# Patient Record
Sex: FEMALE | NOT HISPANIC OR LATINO | Employment: UNEMPLOYED | URBAN - METROPOLITAN AREA
[De-identification: names, ages, dates, MRNs, and addresses within clinical notes are randomized per-mention and may not be internally consistent; named-entity substitution may affect disease eponyms.]

---

## 2017-02-08 ENCOUNTER — OFFICE VISIT - HEALTHEAST (OUTPATIENT)
Dept: FAMILY MEDICINE | Facility: CLINIC | Age: 57
End: 2017-02-08

## 2017-02-08 DIAGNOSIS — L03.317 CELLULITIS OF BUTTOCK, RIGHT: ICD-10-CM

## 2017-02-08 DIAGNOSIS — Z23 NEEDS FLU SHOT: ICD-10-CM

## 2017-02-12 ENCOUNTER — COMMUNICATION - HEALTHEAST (OUTPATIENT)
Dept: FAMILY MEDICINE | Facility: CLINIC | Age: 57
End: 2017-02-12

## 2017-11-08 ENCOUNTER — OFFICE VISIT - HEALTHEAST (OUTPATIENT)
Dept: FAMILY MEDICINE | Facility: CLINIC | Age: 57
End: 2017-11-08

## 2017-11-08 DIAGNOSIS — R60.0 LOWER EXTREMITY EDEMA: ICD-10-CM

## 2017-11-08 DIAGNOSIS — Z12.4 CERVICAL CANCER SCREENING: ICD-10-CM

## 2017-11-08 DIAGNOSIS — Z23 NEED FOR INFLUENZA VACCINATION: ICD-10-CM

## 2017-11-08 DIAGNOSIS — Z13.1 SCREENING FOR DIABETES MELLITUS: ICD-10-CM

## 2017-11-08 DIAGNOSIS — Z13.29 SCREENING FOR THYROID DISORDER: ICD-10-CM

## 2017-11-08 DIAGNOSIS — Z13.220 SCREENING FOR CHOLESTEROL LEVEL: ICD-10-CM

## 2017-11-08 DIAGNOSIS — Z12.11 SCREENING FOR COLON CANCER: ICD-10-CM

## 2017-11-08 DIAGNOSIS — Z00.00 ROUTINE GENERAL MEDICAL EXAMINATION AT A HEALTH CARE FACILITY: ICD-10-CM

## 2017-11-08 DIAGNOSIS — D50.9 IRON DEFICIENCY ANEMIA: ICD-10-CM

## 2017-11-08 LAB — HBA1C MFR BLD: 5.6 % (ref 3.5–6)

## 2017-11-08 ASSESSMENT — MIFFLIN-ST. JEOR: SCORE: 1336.11

## 2017-11-09 ENCOUNTER — COMMUNICATION - HEALTHEAST (OUTPATIENT)
Dept: FAMILY MEDICINE | Facility: CLINIC | Age: 57
End: 2017-11-09

## 2017-11-09 ENCOUNTER — AMBULATORY - HEALTHEAST (OUTPATIENT)
Dept: FAMILY MEDICINE | Facility: CLINIC | Age: 57
End: 2017-11-09

## 2017-11-09 DIAGNOSIS — D50.9 IRON DEFICIENCY ANEMIA: ICD-10-CM

## 2017-11-09 RX ORDER — FERROUS SULFATE 325(65) MG
1 TABLET ORAL 2 TIMES DAILY
Qty: 60 TABLET | Refills: 3 | Status: SHIPPED | OUTPATIENT
Start: 2017-11-09 | End: 2024-08-28

## 2017-11-13 LAB
HPV INTERPRETATION - HISTORICAL: NORMAL
HPV INTERPRETER - HISTORICAL: NORMAL

## 2017-11-15 LAB
BKR LAB AP ABNORMAL BLEEDING: YES
BKR LAB AP BIRTH CONTROL/HORMONES: NORMAL
BKR LAB AP CERVICAL APPEARANCE: NORMAL
BKR LAB AP GYN ADEQUACY: NORMAL
BKR LAB AP GYN INTERPRETATION: NORMAL
BKR LAB AP HPV REFLEX: NORMAL
BKR LAB AP LMP: NORMAL
BKR LAB AP PATIENT STATUS: NORMAL
BKR LAB AP PREVIOUS ABNORMAL: NORMAL
BKR LAB AP PREVIOUS NORMAL: NORMAL
HIGH RISK?: YES
PATH REPORT.COMMENTS IMP SPEC: NORMAL
RESULT FLAG (HE HISTORICAL CONVERSION): NORMAL

## 2017-12-14 ENCOUNTER — COMMUNICATION - HEALTHEAST (OUTPATIENT)
Dept: FAMILY MEDICINE | Facility: CLINIC | Age: 57
End: 2017-12-14

## 2018-06-06 ENCOUNTER — COMMUNICATION - HEALTHEAST (OUTPATIENT)
Dept: FAMILY MEDICINE | Facility: CLINIC | Age: 58
End: 2018-06-06

## 2018-06-14 ENCOUNTER — COMMUNICATION - HEALTHEAST (OUTPATIENT)
Dept: FAMILY MEDICINE | Facility: CLINIC | Age: 58
End: 2018-06-14

## 2021-05-26 ENCOUNTER — RECORDS - HEALTHEAST (OUTPATIENT)
Dept: ADMINISTRATIVE | Facility: CLINIC | Age: 61
End: 2021-05-26

## 2021-05-30 VITALS — WEIGHT: 147.12 LBS | BODY MASS INDEX: 20.52 KG/M2

## 2021-05-31 VITALS — BODY MASS INDEX: 20.61 KG/M2 | HEIGHT: 71 IN | WEIGHT: 147.25 LBS

## 2021-06-08 NOTE — PROGRESS NOTES
ASSESSMENT & PLAN:  1. Cellulitis of buttock, right  - cephalexin (KEFLEX) 500 MG capsule; Take 1 capsule (500 mg total) by mouth 3 (three) times a day for 14 days.  Dispense: 42 capsule; Refill: 0  - Culture, Wound, result is pending, adjust treatment based on culture and sensitivity  Symptomatic care discussed with heat, topical wound care, oral antibiotic as prescribed, follow up if not improving.  2. Needs flu shot  - Influenza, Seasonal Quad, Preservative Free 36+ Months    There are no Patient Instructions on file for this visit.    Orders Placed This Encounter   Procedures     Culture, Wound     LOCATION: right buttock     Influenza, Seasonal Quad, Preservative Free 36+ Months     There are no discontinued medications.    No Follow-up on file.    CHIEF COMPLAINT:  Chief Complaint   Patient presents with     Other     Abscess- on buttock- right side- not drying up. May need antibiotic      Flu Vaccine     needs flu shot       HISTORY OF PRESENT ILLNESS:  Yamilet is a 56 y.o. female presenting to the clinic today complaining of a right buttock abscess x 3 weeks. The abscess was initially draining and enlarged. It has since decreased in size and stopped draining, but she feels pus is still collecting in the area. There is pain surrounding the area. She applied antibiotic ointment and gauze to the area and took warm baths. She had a similar abscess on the left side in December 2016, which resolved on its own.    Health Maintenance:  She received the influenza vaccine today.    REVIEW OF SYSTEMS:   All other systems are negative.    PFSH:  Tobacco Use:  History   Smoking Status     Never Smoker   Smokeless Tobacco     Never Used       VITALS:  Vitals:    02/08/17 1323   BP: 102/68   Patient Site: Right Arm   Patient Position: Sitting   Cuff Size: Adult Regular   Pulse: 82   SpO2: 99%   Weight: 147 lb 1.9 oz (66.7 kg)     Wt Readings from Last 3 Encounters:   02/08/17 147 lb 1.9 oz (66.7 kg)   10/17/16 144 lb (65.3  kg)   10/14/16 147 lb 11.2 oz (67 kg)     Body mass index is 20.52 kg/(m^2).    PHYSICAL EXAM:  General:  Patient alert, in no acute distress.  Skin:  3x4 cm erythema in lower inner portion of the right gluteus with a 0.5 cm scab in the center. Small pustular lesion a small distance from the first lesion indicative of a new lesion.  Neuro:  CN II-XII intact.  Psychiatric:  Alert & oriented x 3    ADDITIONAL HISTORY SUMMARIZED (2): None.  DECISION TO OBTAIN EXTRA INFORMATION (1): None.   RADIOLOGY TESTS (1): None.  LABS (1): Labs ordered today.  MEDICINE TESTS (1): None.  INDEPENDENT REVIEW (2 each): None.     The visit lasted a total of 9 minutes face to face with the patient. Over 50% of the time was spent counseling and educating the patient about abscess.    ILuna, am scribing for and in the presence of, Dr. Yanes.    I, Dr. Yanes, personally performed the services described in this documentation, as scribed by Luna Magaña in my presence, and it is both accurate and complete.    Dragon dictation was used for this note.  Speech recognition errors are a possibility.    MEDICATIONS:  Current Outpatient Prescriptions   Medication Sig Dispense Refill     cephalexin (KEFLEX) 500 MG capsule Take 1 capsule (500 mg total) by mouth 3 (three) times a day for 14 days. 42 capsule 0     No current facility-administered medications for this visit.        Total data points: 1

## 2021-06-14 NOTE — PROGRESS NOTES
FEMALE PREVENTIVE EXAM    Assessment & Plan   1. Routine general medical examination at a health care facility:  Not fasting, will return for lipid panel.  Pap today, if normal will repeat in five years    2. Cervical cancer screening  - Gynecologic Cytology (PAP Smear)    3. Iron deficiency anemia:  Recheck today and advise on iron supplementation  - HM2(CBC w/o Differential)  - Ferritin    4. Lower extremity edema:  Mild edema on exam, recommended continued use of compression stockings and decrease use of NSAIDs  - Basic Metabolic Panel    5. Screening for cholesterol level  - Lipid Cascade; Future    6. Screening for diabetes mellitus  - Glycosylated Hemoglobin A1c    7. Screening for thyroid disorder  - Thyroid Cascade    8. Need for influenza vaccination  - Influenza, Seasonal Quad, Preservative Free 36+ Months    9. Screening for colon cancer : Order form completed for Cologuard, she will check with insurance and contact if she would like this faxed    Recommend repeat pap smear if normal every five years  Alcohol use, safety and moderation discussed.  Recommended adequate calcium intake/osteoporosis prevention.  Discussed colon cancer screening at age 50, 45 if -American.  Diet discuss, including moderation of portions sizes, avoiding eating out and fast food and increase in fruits and vegetables.  Discussed safe sex practices.  Discussed & recommended seat belt (& motorcycle helmet) use.      Kaela Lechuga CNP    Subjective:   Chief Complaint:  Annual Exam (non-fasting; pap) and Leg Swelling (bilateral leg and foot swelling, started this summer; started exercising more and wears compression stockings, would like to know if there is anything else she should be doing)    HPI:  Yamilet Hedrick is a 57 y.o. female who presents for routine physical exam.  She is a previous patient of Dr. Tidwell in the distant past.  PMH notable for anemia, otherwise negative.     Currently following with  [facility] for abnormal mammogram with calcifications.  Is deciding whether to proceed with biopsy or repeat mammogram in six months.  FMH negative except for first cousin.      Anemia: History of anemia for many years, though to be related to heavy menstrual cycles.  Currently taking ferrous gluconate 27mg daily.  Colonoscopy and endoscopy at age 42 were normal.      LE edema:  Bilateral for over a year.  Improves with use of compression stockings.  Has increased activity level and now training for a 5K.  This has helped significantly, much improved from prior to regular exercise routine.  Denies SOB, chest pain. Of note, has been taking ibuprofen almost daily for menstrual related discomfort and muscle aches.      Periods heavier, becoming more irregular with timing.  No intramenstrual bleeding. Notes cramping has worsened slightly.      Has followed with derm for skin checks.  FMH of non-melanoma skin cancer in father    OB/Gyn History:  Menstrual history: periods becoming more heavy  Date of previous pap:   History of abnormal pap: none  Current Contraceptive method: none    Preventive Health:  Reviewed and recommended screening and treatment recommendations:  Mammography: see HPI  Colonoscopy: at age 42.  Interested in Cologuard  Immunizations: up to date    Health Habits:    Exercise: yes.  Calcium intake/Osteoporosis prevention: yes  Guns: NO  Sun Screen: YES    PMH:   Patient Active Problem List   Diagnosis     Anemia     Vaginal Itching      Delivery - Delivered     Cellulitis Of The Buttocks       No past medical history on file.    Current Medications: Reviewed   No current outpatient prescriptions on file prior to visit.     No current facility-administered medications on file prior to visit.        Allergies:  Reviewed  has No Known Allergies.    Social History:  Social History     Occupational History     Not on file.     Social History Main Topics     Smoking status: Never Smoker      "Smokeless tobacco: Never Used     Alcohol use Not on file     Drug use: Not on file     Sexual activity: Not on file       Family History:   No family history on file.      Review of Systems:  Complete head to toe review of systems is otherwise negative except as above.    Objective:    /62 (Patient Site: Left Arm, Patient Position: Sitting, Cuff Size: Adult Regular)  Ht 5' 10.5\" (1.791 m)  Wt 147 lb 4 oz (66.8 kg)  LMP 10/30/2017 (Exact Date)  Breastfeeding? No  BMI 20.83 kg/m2    GENERAL: Alert, well-appearing female   PSYCH: Pleasant mood, affect appropriate.    SKIN: Darker moles with irregular border on left anterior shoulder and right lateral foot.    HEAD: Normocephalic, atraumatic  EYES: Conjunctiva pink, sclera white, no exudates. MABLE.  EOMs intact. Corneal light reflex bilaterally, red reflex present. Undilated fundoscopic exam normal  EARS: TMs pearly grey, no bulging, redness, retraction.   MOUTH: Pharynx moist, pink without exudate. No tonsillar enlargement  NECK: No lymphadenopathy. Thyroid borders smooth without enlargement, nodules.   CV: Regular rate and rhythm without murmurs, rubs or gallops.  RESP: Lung sounds clear, symmetric excursion.   ABDOMEN: BS+. Abdomen soft, nontender to palpation without guarding. No organomegaly, masses or hernias  BREASTS: Breasts symmetric, no dimpling, masses or skin discolorations seen. Areolas and nipples symmetric without discharge. On palpation, breast tissue supple and nontender. No masses or nodules. Axillary and epitrochlear lymph nodes nonpalpable.    FEMALE: External genitalia without lesions. Vaginal walls and cervix without lesions or masses. No abnormal discharge. Pap smear obtained. On bimanual palpation, uterus mobile, normal shape and contour. No adnexal masses or tenderness.   MSK: Spine and extremities in alignment.   NEURO: Alert, oriented.   PV :  No edema, tenderness or varicosities.         "

## 2021-07-03 NOTE — ADDENDUM NOTE
Addendum Note by Georgette Yanes MD at 2/9/2017 11:58 AM     Author: Georgette Yanes MD Service: -- Author Type: Physician    Filed: 2/9/2017 11:58 AM Encounter Date: 2/8/2017 Status: Signed    : Georgette Yanes MD (Physician)    Addended by: GEORGETTE YANES on: 2/9/2017 11:58 AM        Modules accepted: Carmen

## 2024-01-17 ENCOUNTER — IMMUNIZATION (OUTPATIENT)
Dept: FAMILY MEDICINE | Facility: CLINIC | Age: 64
End: 2024-01-17
Payer: COMMERCIAL

## 2024-01-17 DIAGNOSIS — Z23 ENCOUNTER FOR IMMUNIZATION: Primary | ICD-10-CM

## 2024-01-17 PROCEDURE — 90471 IMMUNIZATION ADMIN: CPT

## 2024-01-17 PROCEDURE — 90686 IIV4 VACC NO PRSV 0.5 ML IM: CPT

## 2024-01-17 PROCEDURE — 91320 SARSCV2 VAC 30MCG TRS-SUC IM: CPT

## 2024-01-17 PROCEDURE — 99207 PR NO CHARGE NURSE ONLY: CPT

## 2024-01-17 PROCEDURE — 90480 ADMN SARSCOV2 VAC 1/ONLY CMP: CPT

## 2024-01-17 NOTE — PROGRESS NOTES
.immPrior to immunization administration, verified patients identity using patient s name and date of birth. Please see Immunization Activity for additional information.     Screening Questionnaire for Adult Immunization    Are you sick today?   No   Do you have allergies to medications, food, a vaccine component or latex?   No   Have you ever had a serious reaction after receiving a vaccination?   No   Do you have a long-term health problem with heart, lung, kidney, or metabolic disease (e.g., diabetes), asthma, a blood disorder, no spleen, complement component deficiency, a cochlear implant, or a spinal fluid leak?  Are you on long-term aspirin therapy?   No   Do you have cancer, leukemia, HIV/AIDS, or any other immune system problem?   No   Do you have a parent, brother, or sister with an immune system problem?   No   In the past 3 months, have you taken medications that affect  your immune system, such as prednisone, other steroids, or anticancer drugs; drugs for the treatment of rheumatoid arthritis, Crohn s disease, or psoriasis; or have you had radiation treatments?   No   Have you had a seizure, or a brain or other nervous system problem?   No   During the past year, have you received a transfusion of blood or blood    products, or been given immune (gamma) globulin or antiviral drug?   No   For women: Are you pregnant or is there a chance you could become       pregnant during the next month?   No   Have you received any vaccinations in the past 4 weeks?   No     Immunization questionnaire answers were all negative.    I have reviewed the following standing orders:   This patient is due and qualifies for the Covid-19 vaccine.     Click here for COVID-19 Standing Order    I have reviewed the vaccines inclusion and exclusion criteria; No concerns regarding eligibility.     This patient is due and qualifies for the Influenza vaccine.    Click here for Influenza Vaccine Standing Order    I have reviewed the  vaccines inclusion and exclusion criteria; No concerns regarding eligibility.     Patient instructed to remain in clinic for 15 minutes afterwards, and to report any adverse reactions.     Screening performed by Reynaldo Hoskins MA on 1/17/2024 at 1:39 PM.

## 2024-03-02 ENCOUNTER — HEALTH MAINTENANCE LETTER (OUTPATIENT)
Age: 64
End: 2024-03-02

## 2024-08-28 ENCOUNTER — OFFICE VISIT (OUTPATIENT)
Dept: INTERNAL MEDICINE | Facility: CLINIC | Age: 64
End: 2024-08-28
Payer: COMMERCIAL

## 2024-08-28 VITALS
SYSTOLIC BLOOD PRESSURE: 102 MMHG | RESPIRATION RATE: 15 BRPM | BODY MASS INDEX: 20.3 KG/M2 | HEART RATE: 75 BPM | OXYGEN SATURATION: 99 % | HEIGHT: 71 IN | DIASTOLIC BLOOD PRESSURE: 63 MMHG | TEMPERATURE: 97.5 F | WEIGHT: 145 LBS

## 2024-08-28 DIAGNOSIS — N95.1 MENOPAUSAL SYNDROME (HOT FLASHES): ICD-10-CM

## 2024-08-28 DIAGNOSIS — Z12.31 VISIT FOR SCREENING MAMMOGRAM: ICD-10-CM

## 2024-08-28 DIAGNOSIS — Z12.4 CERVICAL CANCER SCREENING: ICD-10-CM

## 2024-08-28 DIAGNOSIS — Z11.4 SCREENING FOR HIV (HUMAN IMMUNODEFICIENCY VIRUS): ICD-10-CM

## 2024-08-28 DIAGNOSIS — Z11.59 NEED FOR HEPATITIS C SCREENING TEST: ICD-10-CM

## 2024-08-28 DIAGNOSIS — Z12.11 SCREEN FOR COLON CANCER: Primary | ICD-10-CM

## 2024-08-28 DIAGNOSIS — H61.22 IMPACTED CERUMEN OF LEFT EAR: ICD-10-CM

## 2024-08-28 DIAGNOSIS — H93.8X2 PLUGGED FEELING IN EAR, LEFT: ICD-10-CM

## 2024-08-28 PROCEDURE — 90750 HZV VACC RECOMBINANT IM: CPT | Performed by: INTERNAL MEDICINE

## 2024-08-28 PROCEDURE — 90471 IMMUNIZATION ADMIN: CPT | Performed by: INTERNAL MEDICINE

## 2024-08-28 PROCEDURE — 99204 OFFICE O/P NEW MOD 45 MIN: CPT | Mod: 25 | Performed by: INTERNAL MEDICINE

## 2024-08-28 PROCEDURE — 69209 REMOVE IMPACTED EAR WAX UNI: CPT | Mod: LT | Performed by: INTERNAL MEDICINE

## 2024-08-28 ASSESSMENT — PAIN SCALES - GENERAL: PAINLEVEL: NO PAIN (0)

## 2024-08-28 NOTE — NURSING NOTE
RN ear assessment completed prior to ear irrigation. Otoscopic exam reveals cerumen present and irrigation advised, left ear only. Post Ear Irrigation exam completed, no bleeding noticed but patient was uncomfortable and opted to stop irrigation. Pain assessment completed, patient denies pain when resting however is feeling discomfort during irrigation.    Patient tolerated procedure:  no - experienced the following symptoms:  discomfort    Patient plans to utilize debrox ear drops and return for MA/Nurse visit in future to reattempt ear wash.

## 2024-08-28 NOTE — PATIENT INSTRUCTIONS
Venlafaxine and gabapentin are alternative  Venlafaxine is an antidepressant and gabapentin is a neve system

## 2024-08-28 NOTE — PROGRESS NOTES
Assessment & Plan     Screen for colon cancer  She has not seen her primary care doctor for a while do recommend colonoscopy screening  - Colonoscopy Screening  Referral; Future    Screening for HIV (human immunodeficiency virus)      Need for hepatitis C screening test      Visit for screening mammogram  Did order for mammogram  - MA Screen Bilateral w/Chuckie; Future    Cervical cancer screening  She is past due for Pap smear she can return at another visit    Plugged feeling in ear, left  She does have impacted cerumen only in left ear.  I did tell her left we can only offer ear irrigation.  If it does not come out we recommend Debrox eardrops and then possible ENT referral if she still has a plugged feeling    Menopausal syndrome (hot flashes)  Spent some time counseling over when menopausal symptoms and options for no treatment supportive care, versus estrogen treatment versus nonestrogen treatment gave her information to read.  She does need labs Pap smear and a normal mammogram if she does choose to use hormone replacement therapy.  She would like more information on nonhormonal therapy like gabapentin and venlafaxine  - Lipid panel reflex to direct LDL Non-fasting; Future  - TSH; Future  - Basic metabolic panel  (Ca, Cl, CO2, Creat, Gluc, K, Na, BUN); Future  - AST; Future  - ALT; Future    Impacted cerumen of left ear    - REMOVE IMPACTED CERUMEN NC                Anabel Woodard is a 64 year old, presenting for the following health issues:  Menopause three years    Late menarche   Had them all along     Ear Problem and Imm/Inj (Possible left ear infection. It feels plugged. Due for a few vaccines/shingles and RSV? She would like to discuss treatment for menopause.)      8/28/2024     1:29 PM   Additional Questions   Roomed by Iona MCINTYRE     Ear Problem    Imm/Inj    History of Present Illness       Reason for visit:  Plugged ear  Symptom onset:  1-2 weeks ago  Symptoms include:  Left ear feels  "blocked  Symptom intensity:  Moderate  Symptom progression:  Worsening  Had these symptoms before:  No  What makes it worse:  Not applicable  What makes it better:  Not applicable   She is taking medications regularly.                     Objective    /63 (BP Location: Left arm, Patient Position: Sitting, Cuff Size: Adult Regular)   Pulse 75   Temp 97.5  F (36.4  C) (Tympanic)   Resp 15   Ht 1.803 m (5' 11\")   Wt 65.8 kg (145 lb)   LMP  (LMP Unknown)   SpO2 99%   BMI 20.22 kg/m    Body mass index is 20.22 kg/m .  Physical Exam   Left ear impacter cerumen . Right ear normal TM             Signed Electronically by: Candice Arteaga MD    "

## 2025-03-15 ENCOUNTER — HEALTH MAINTENANCE LETTER (OUTPATIENT)
Age: 65
End: 2025-03-15

## 2025-07-17 ENCOUNTER — LAB (OUTPATIENT)
Dept: LAB | Facility: CLINIC | Age: 65
End: 2025-07-17
Payer: MEDICAID

## 2025-07-17 DIAGNOSIS — Z11.59 NEED FOR HEPATITIS C SCREENING TEST: ICD-10-CM

## 2025-07-17 DIAGNOSIS — Z11.4 SCREENING FOR HIV (HUMAN IMMUNODEFICIENCY VIRUS): ICD-10-CM

## 2025-07-17 DIAGNOSIS — N95.1 MENOPAUSAL SYNDROME (HOT FLASHES): ICD-10-CM

## 2025-07-17 DIAGNOSIS — Z13.1 SCREENING FOR DIABETES MELLITUS: ICD-10-CM

## 2025-07-17 LAB
ALT SERPL W P-5'-P-CCNC: 16 U/L (ref 0–50)
ANION GAP SERPL CALCULATED.3IONS-SCNC: 9 MMOL/L (ref 7–15)
AST SERPL W P-5'-P-CCNC: 23 U/L (ref 0–45)
BUN SERPL-MCNC: 18 MG/DL (ref 8–23)
CALCIUM SERPL-MCNC: 9.9 MG/DL (ref 8.8–10.4)
CHLORIDE SERPL-SCNC: 101 MMOL/L (ref 98–107)
CREAT SERPL-MCNC: 0.64 MG/DL (ref 0.51–0.95)
EGFRCR SERPLBLD CKD-EPI 2021: >90 ML/MIN/1.73M2
FASTING STATUS PATIENT QL REPORTED: NO
GLUCOSE SERPL-MCNC: 81 MG/DL (ref 70–99)
GLUCOSE SERPL-MCNC: 85 MG/DL (ref 70–99)
HCO3 SERPL-SCNC: 27 MMOL/L (ref 22–29)
HCV AB SERPL QL IA: NONREACTIVE
HIV 1+2 AB+HIV1 P24 AG SERPL QL IA: NONREACTIVE
POTASSIUM SERPL-SCNC: 4.2 MMOL/L (ref 3.4–5.3)
SODIUM SERPL-SCNC: 137 MMOL/L (ref 135–145)
TSH SERPL DL<=0.005 MIU/L-ACNC: 0.78 UIU/ML (ref 0.3–4.2)

## 2025-07-21 ENCOUNTER — OFFICE VISIT (OUTPATIENT)
Dept: FAMILY MEDICINE | Facility: CLINIC | Age: 65
End: 2025-07-21
Payer: MEDICAID

## 2025-07-21 VITALS
WEIGHT: 150 LBS | RESPIRATION RATE: 15 BRPM | TEMPERATURE: 97.8 F | BODY MASS INDEX: 21 KG/M2 | OXYGEN SATURATION: 96 % | SYSTOLIC BLOOD PRESSURE: 112 MMHG | HEART RATE: 68 BPM | HEIGHT: 71 IN | DIASTOLIC BLOOD PRESSURE: 66 MMHG

## 2025-07-21 DIAGNOSIS — Z23 ENCOUNTER FOR VACCINATION: ICD-10-CM

## 2025-07-21 DIAGNOSIS — Z12.4 CERVICAL CANCER SCREENING: ICD-10-CM

## 2025-07-21 DIAGNOSIS — Z00.00 ROUTINE GENERAL MEDICAL EXAMINATION AT A HEALTH CARE FACILITY: Primary | ICD-10-CM

## 2025-07-21 DIAGNOSIS — Z12.11 SCREENING FOR MALIGNANT NEOPLASM OF COLON: ICD-10-CM

## 2025-07-21 DIAGNOSIS — N95.1 MENOPAUSAL SYNDROME (HOT FLASHES): ICD-10-CM

## 2025-07-21 DIAGNOSIS — Z13.0 SCREENING FOR IRON DEFICIENCY ANEMIA: ICD-10-CM

## 2025-07-21 DIAGNOSIS — Z13.220 LIPID SCREENING: ICD-10-CM

## 2025-07-21 LAB
CHOLEST SERPL-MCNC: 217 MG/DL
ERYTHROCYTE [DISTWIDTH] IN BLOOD BY AUTOMATED COUNT: 13.3 % (ref 10–15)
FASTING STATUS PATIENT QL REPORTED: NO
HCT VFR BLD AUTO: 37.6 % (ref 35–47)
HDLC SERPL-MCNC: 71 MG/DL
HGB BLD-MCNC: 11.9 G/DL (ref 11.7–15.7)
IRON BINDING CAPACITY (ROCHE): 325 UG/DL (ref 240–430)
IRON SATN MFR SERPL: 30 % (ref 15–46)
IRON SERPL-MCNC: 99 UG/DL (ref 37–145)
LDLC SERPL CALC-MCNC: 123 MG/DL
MCH RBC QN AUTO: 26.6 PG (ref 26.5–33)
MCHC RBC AUTO-ENTMCNC: 31.6 G/DL (ref 31.5–36.5)
MCV RBC AUTO: 84 FL (ref 78–100)
NONHDLC SERPL-MCNC: 146 MG/DL
PLATELET # BLD AUTO: 237 10E3/UL (ref 150–450)
RBC # BLD AUTO: 4.47 10E6/UL (ref 3.8–5.2)
TRIGL SERPL-MCNC: 115 MG/DL
WBC # BLD AUTO: 4.7 10E3/UL (ref 4–11)

## 2025-07-21 PROCEDURE — 83550 IRON BINDING TEST: CPT | Performed by: FAMILY MEDICINE

## 2025-07-21 PROCEDURE — 36415 COLL VENOUS BLD VENIPUNCTURE: CPT | Performed by: FAMILY MEDICINE

## 2025-07-21 PROCEDURE — 83540 ASSAY OF IRON: CPT | Performed by: FAMILY MEDICINE

## 2025-07-21 PROCEDURE — 85027 COMPLETE CBC AUTOMATED: CPT | Performed by: FAMILY MEDICINE

## 2025-07-21 PROCEDURE — G0145 SCR C/V CYTO,THINLAYER,RESCR: HCPCS | Performed by: FAMILY MEDICINE

## 2025-07-21 PROCEDURE — 87624 HPV HI-RISK TYP POOLED RSLT: CPT | Performed by: FAMILY MEDICINE

## 2025-07-21 PROCEDURE — 3078F DIAST BP <80 MM HG: CPT | Performed by: FAMILY MEDICINE

## 2025-07-21 PROCEDURE — 91320 SARSCV2 VAC 30MCG TRS-SUC IM: CPT | Performed by: FAMILY MEDICINE

## 2025-07-21 PROCEDURE — 3074F SYST BP LT 130 MM HG: CPT | Performed by: FAMILY MEDICINE

## 2025-07-21 PROCEDURE — 90677 PCV20 VACCINE IM: CPT | Performed by: FAMILY MEDICINE

## 2025-07-21 PROCEDURE — 99396 PREV VISIT EST AGE 40-64: CPT | Mod: 25 | Performed by: FAMILY MEDICINE

## 2025-07-21 PROCEDURE — 90480 ADMN SARSCOV2 VAC 1/ONLY CMP: CPT | Performed by: FAMILY MEDICINE

## 2025-07-21 PROCEDURE — 90471 IMMUNIZATION ADMIN: CPT | Performed by: FAMILY MEDICINE

## 2025-07-21 PROCEDURE — 80061 LIPID PANEL: CPT | Performed by: FAMILY MEDICINE

## 2025-07-21 SDOH — HEALTH STABILITY: PHYSICAL HEALTH: ON AVERAGE, HOW MANY DAYS PER WEEK DO YOU ENGAGE IN MODERATE TO STRENUOUS EXERCISE (LIKE A BRISK WALK)?: 3 DAYS

## 2025-07-21 ASSESSMENT — SOCIAL DETERMINANTS OF HEALTH (SDOH): HOW OFTEN DO YOU GET TOGETHER WITH FRIENDS OR RELATIVES?: ONCE A WEEK

## 2025-07-21 NOTE — NURSING NOTE
Prior to immunization administration, verified patients identity using patient s name and date of birth. Please see Immunization Activity for additional information.     Screening Questionnaire for Adult Immunization    Are you sick today?   No   Do you have allergies to medications, food, a vaccine component or latex?   No   Have you ever had a serious reaction after receiving a vaccination?   No   Do you have a long-term health problem with heart, lung, kidney, or metabolic disease (e.g., diabetes), asthma, a blood disorder, no spleen, complement component deficiency, a cochlear implant, or a spinal fluid leak?  Are you on long-term aspirin therapy?   No   Do you have cancer, leukemia, HIV/AIDS, or any other immune system problem?   No   Do you have a parent, brother, or sister with an immune system problem?   No   In the past 3 months, have you taken medications that affect  your immune system, such as prednisone, other steroids, or anticancer drugs; drugs for the treatment of rheumatoid arthritis, Crohn s disease, or psoriasis; or have you had radiation treatments?   No   Have you had a seizure, or a brain or other nervous system problem?   No   During the past year, have you received a transfusion of blood or blood    products, or been given immune (gamma) globulin or antiviral drug?   No   For women: Are you pregnant or is there a chance you could become       pregnant during the next month?   No   Have you received any vaccinations in the past 4 weeks?   No     Immunization questionnaire answers were all negative.      Patient instructed to remain in clinic for 15 minutes afterwards, and to report any adverse reactions.     Screening performed by Shyla Santoyo MA on 7/21/2025 at 12:43 PM.

## 2025-07-21 NOTE — PROGRESS NOTES
Preventive Care Visit  [facility]  Luke Barger , Family Medicine  Jul 21, 2025      Assessment & Plan     Routine general medical examination at a health care facility    Lipid screening  - Lipid panel reflex to direct LDL Non-fasting    Screening for malignant neoplasm of colon  - Colonoscopy Screening  Referral    Cervical cancer screening  - HPV and Gynecologic Cytology Panel - Recommended Age 30-65 Years    Screening for iron deficiency anemia  - CBC with platelets  - Iron and iron binding capacity    Encounter for vaccination  - COVID-19 12+ (PFIZER)  - Pneumococcal 20 Valent Conjugate (PCV20)    Menopausal syndrome (hot flashes)  -Pt considering HRT for symptoms. Will schedule follow-up visit if interested.         Counseling  Appropriate preventive services were addressed with this patient via screening, questionnaire, or discussion as appropriate for fall prevention, nutrition, physical activity, Tobacco-use cessation, social engagement, weight loss and cognition.  Checklist reviewing preventive services available has been given to the patient.  Reviewed patient's diet, addressing concerns and/or questions.   She is at risk for lack of exercise and has been provided with information to increase physical activity for the benefit of her well-being.   The patient was instructed to see the dentist every 6 months.           Anabel Woodard is a 65 year old, presenting for the following:  Physical        7/21/2025    11:33 AM   Additional Questions   Roomed by PRIYA TOLBERT     Menopause at age 61.    Iron test -was concern before menopause. Wants to get it rechecked.     Hot flashes several times a day. Osteoporosis concerns. Active -walk and run.        Advance Care Planning    Discussed advance care planning with patient; informed AVS has link to [facility].        7/21/2025   General Health   How would you rate your overall physical health? Excellent   Feel  stress (tense, anxious, or unable to sleep) Only a little   (!) STRESS CONCERN      7/21/2025   Nutrition   Three or more servings of calcium each day? Yes   Diet: Regular (no restrictions)   How many servings of fruit and vegetables per day? (!) 2-3   How many sweetened beverages each day? 0-1         7/21/2025   Exercise   Days per week of moderate/strenous exercise 3 days         7/21/2025   Social Factors   Frequency of gathering with friends or relatives Once a week   Worry food won't last until get money to buy more No   Food not last or not have enough money for food? No   Do you have housing? (Housing is defined as stable permanent housing and does not include staying outside in a car, in a tent, in an abandoned building, in an overnight shelter, or couch-surfing.) Yes   Are you worried about losing your housing? No   Lack of transportation? No   Unable to get utilities (heat,electricity)? No         7/21/2025   Fall Risk   Fallen 2 or more times in the past year? No   Trouble with walking or balance? No          7/21/2025   Dental   Dentist two times every year? (!) NO         Today's PHQ-2 Score:       7/21/2025    11:11 AM   PHQ-2 ( 1999 Pf[employer])   Q1: Little interest or pleasure in doing things 0   Q2: Feeling down, depressed or hopeless 0   PHQ-2 Score 0    Q1: Little interest or pleasure in doing things Not at all   Q2: Feeling down, depressed or hopeless Not at all   PHQ-2 Score 0       Patient-reported           7/21/2025   Substance Use   Alcohol more than 3/day or more than 7/wk No   Do you use any other substances recreationally? No     Social History     Tobacco Use    Smoking status: Never    Smokeless tobacco: Never   Vaping Use    Vaping status: Never Used                  7/21/2025   STI Screening   New sexual partner(s) since last STI/HIV test? No     History of abnormal Pap smear: No - age 30-64 HPV with reflex Pap every 5 years recommended        11/8/2017    11:18 AM   PAP / HPV   PAP  "Negative for squamous intraepithelial lesion or malignancy  Electronically signed by Tyra Rodriguez CT (ASCP) on 11/15/2017 at  2:53 PM        ASCVD Risk   The ASCVD Risk score (Ashu ROB, et al., 2019) failed to calculate for the following reasons:    Cannot find a previous HDL lab    Cannot find a previous total cholesterol lab           Reviewed and updated as needed this visit by Provider   Tobacco      Surg Hx  Fam Hx  Soc Hx Sexual Activity                   Objective    Exam  /66 (BP Location: Right arm, Patient Position: Sitting, Cuff Size: Adult Regular)   Pulse 68   Temp 97.8  F (36.6  C) (Temporal)   Resp 15   Ht 1.8 m (5' 10.87\")   Wt 68 kg (150 lb)   SpO2 96%   BMI 21.00 kg/m     Estimated body mass index is 21 kg/m  as calculated from the following:    Height as of this encounter: 1.8 m (5' 10.87\").    Weight as of this encounter: 68 kg (150 lb).    Physical Exam  GENERAL: alert and no distress  EYES: Eyes grossly normal to inspection, PERRL and conjunctivae and sclerae normal  HENT: nose and mouth without ulcers or lesions  NECK: no adenopathy, no asymmetry, masses, or scars  RESP: lungs clear to auscultation - no rales, rhonchi or wheezes  CV: regular rate and rhythm, normal S1 S2, no S3 or S4, no murmur, click or rub, no peripheral edema  ABDOMEN: soft, nontender, no hepatosplenomegaly, no masses and bowel sounds normal   (female): normal female external genitalia, normal urethral meatus , normal vaginal mucosa, and normal cervix  MS: no gross musculoskeletal defects noted, no edema  SKIN: no suspicious lesions or rashes  NEURO: Normal strength and tone, mentation intact and speech normal  PSYCH: mentation appears normal, affect normal/bright        Signed Electronically by: Luke Barger DO    "

## 2025-07-21 NOTE — PATIENT INSTRUCTIONS
Patient Education   Preventive Care Advice   This is general advice given by our system to help you stay healthy. However, your care team may have specific advice just for you. Please talk to your care team about your preventive care needs.  Nutrition  Eat 5 or more servings of fruits and vegetables each day.  Try wheat bread, brown rice and whole grain pasta (instead of white bread, rice, and pasta).  Get enough calcium and vitamin D. Check the label on foods and aim for 100% of the RDA (recommended daily allowance).  Lifestyle  Exercise at least 150 minutes each week  (30 minutes a day, 5 days a week).  Do muscle strengthening activities 2 days a week. These help control your weight and prevent disease.  No smoking.  Wear sunscreen to prevent skin cancer.  Have a dental exam and cleaning every 6 months.  Yearly exams  See your health care team every year to talk about:  Any changes in your health.  Any medicines your care team has prescribed.  Preventive care, family planning, and ways to prevent chronic diseases.  Shots (vaccines)   HPV shots (up to age 26), if you've never had them before.  Hepatitis B shots (up to age 59), if you've never had them before.  COVID-19 shot: Get this shot when it's due.  Flu shot: Get a flu shot every year.  Tetanus shot: Get a tetanus shot every 10 years.  Pneumococcal, hepatitis A, and RSV shots: Ask your care team if you need these based on your risk.  Shingles shot (for age 50 and up)  General health tests  Diabetes screening:  Starting at age 35, Get screened for diabetes at least every 3 years.  If you are younger than age 35, ask your care team if you should be screened for diabetes.  Cholesterol test: At age 39, start having a cholesterol test every 5 years, or more often if advised.  Bone density scan (DEXA): At age 50, ask your care team if you should have this scan for osteoporosis (brittle bones).  Hepatitis C: Get tested at least once in your life.  STIs (sexually  transmitted infections)  Before age 24: Ask your care team if you should be screened for STIs.  After age 24: Get screened for STIs if you're at risk. You are at risk for STIs (including HIV) if:  You are sexually active with more than one person.  You don't use condoms every time.  You or a partner was diagnosed with a sexually transmitted infection.  If you are at risk for HIV, ask about PrEP medicine to prevent HIV.  Get tested for HIV at least once in your life, whether you are at risk for HIV or not.  Cancer screening tests  Cervical cancer screening: If you have a cervix, begin getting regular cervical cancer screening tests starting at age 21.  Breast cancer scan (mammogram): If you've ever had breasts, begin having regular mammograms starting at age 40. This is a scan to check for breast cancer.  Colon cancer screening: It is important to start screening for colon cancer at age 45.  Have a colonoscopy test every 10 years (or more often if you're at risk) Or, ask your provider about stool tests like a FIT test every year or Cologuard test every 3 years.  To learn more about your testing options, visit:   .  For help making a decision, visit:   https://bit.ly/zz32221.  Prostate cancer screening test: If you have a prostate, ask your care team if a prostate cancer screening test (PSA) at age 55 is right for you.  Lung cancer screening: If you are a current or former smoker ages 50 to 80, ask your care team if ongoing lung cancer screenings are right for you.  For informational purposes only. Not to replace the advice of your health care provider. Copyright   2023 [employer]. All rights reserved. Clinically reviewed by the [facility] Transitions Program. Autopilot [id number] - REV 01/24.

## 2025-07-23 LAB
HPV HR 12 DNA CVX QL NAA+PROBE: NEGATIVE
HPV16 DNA CVX QL NAA+PROBE: NEGATIVE
HPV18 DNA CVX QL NAA+PROBE: NEGATIVE
HUMAN PAPILLOMA VIRUS FINAL DIAGNOSIS: NORMAL

## 2025-07-24 LAB
BKR AP ASSOCIATED HPV REPORT: NORMAL
BKR LAB AP GYN ADEQUACY: NORMAL
BKR LAB AP GYN INTERPRETATION: NORMAL
BKR LAB AP PREVIOUS ABNORMAL: NORMAL
PATH REPORT.COMMENTS IMP SPEC: NORMAL
PATH REPORT.COMMENTS IMP SPEC: NORMAL
PATH REPORT.RELEVANT HX SPEC: NORMAL

## 2025-09-03 ENCOUNTER — TELEPHONE (OUTPATIENT)
Dept: GASTROENTEROLOGY | Facility: CLINIC | Age: 65
End: 2025-09-03
Payer: COMMERCIAL